# Patient Record
Sex: MALE | Race: OTHER | NOT HISPANIC OR LATINO | Employment: UNEMPLOYED | URBAN - METROPOLITAN AREA
[De-identification: names, ages, dates, MRNs, and addresses within clinical notes are randomized per-mention and may not be internally consistent; named-entity substitution may affect disease eponyms.]

---

## 2022-11-30 DIAGNOSIS — S82.401A CLOSED FRACTURE OF RIGHT TIBIA AND FIBULA, INITIAL ENCOUNTER: Primary | ICD-10-CM

## 2022-11-30 DIAGNOSIS — S82.201A CLOSED FRACTURE OF RIGHT TIBIA AND FIBULA, INITIAL ENCOUNTER: Primary | ICD-10-CM

## 2022-12-07 ENCOUNTER — OFFICE VISIT (OUTPATIENT)
Dept: ORTHOPEDICS | Facility: CLINIC | Age: 37
End: 2022-12-07
Payer: MEDICAID

## 2022-12-07 ENCOUNTER — HOSPITAL ENCOUNTER (OUTPATIENT)
Dept: RADIOLOGY | Facility: HOSPITAL | Age: 37
Discharge: HOME | End: 2022-12-07
Attending: ORTHOPAEDIC SURGERY
Payer: MEDICAID

## 2022-12-07 ENCOUNTER — ANESTHESIA EVENT (OUTPATIENT)
Dept: SURGERY | Facility: HOSPITAL | Age: 37
End: 2022-12-07
Payer: MEDICAID

## 2022-12-07 DIAGNOSIS — S82.141A CLOSED FRACTURE OF RIGHT TIBIAL PLATEAU, INITIAL ENCOUNTER: ICD-10-CM

## 2022-12-07 DIAGNOSIS — M25.561 CHRONIC PAIN OF RIGHT KNEE: Primary | ICD-10-CM

## 2022-12-07 DIAGNOSIS — S82.201A CLOSED FRACTURE OF RIGHT TIBIA AND FIBULA, INITIAL ENCOUNTER: ICD-10-CM

## 2022-12-07 DIAGNOSIS — S82.401A CLOSED FRACTURE OF RIGHT TIBIA AND FIBULA, INITIAL ENCOUNTER: ICD-10-CM

## 2022-12-07 DIAGNOSIS — S82.141A TIBIAL PLATEAU FRACTURE, RIGHT: ICD-10-CM

## 2022-12-07 DIAGNOSIS — G89.29 CHRONIC PAIN OF RIGHT KNEE: ICD-10-CM

## 2022-12-07 DIAGNOSIS — G89.29 CHRONIC PAIN OF RIGHT KNEE: Primary | ICD-10-CM

## 2022-12-07 DIAGNOSIS — M25.561 CHRONIC PAIN OF RIGHT KNEE: ICD-10-CM

## 2022-12-07 PROCEDURE — 1159F MED LIST DOCD IN RCRD: CPT | Mod: CPTII,,, | Performed by: ORTHOPAEDIC SURGERY

## 2022-12-07 PROCEDURE — 73564 X-RAY EXAM KNEE 4 OR MORE: CPT | Mod: TC,RT

## 2022-12-07 PROCEDURE — 99204 OFFICE O/P NEW MOD 45 MIN: CPT | Mod: 57,S$PBB,, | Performed by: ORTHOPAEDIC SURGERY

## 2022-12-07 PROCEDURE — 1159F PR MEDICATION LIST DOCUMENTED IN MEDICAL RECORD: ICD-10-PCS | Mod: CPTII,,, | Performed by: ORTHOPAEDIC SURGERY

## 2022-12-07 PROCEDURE — 99213 OFFICE O/P EST LOW 20 MIN: CPT | Mod: PBBFAC

## 2022-12-07 PROCEDURE — 99204 PR OFFICE/OUTPT VISIT, NEW, LEVL IV, 45-59 MIN: ICD-10-PCS | Mod: 57,S$PBB,, | Performed by: ORTHOPAEDIC SURGERY

## 2022-12-07 RX ORDER — HYDROCODONE BITARTRATE AND ACETAMINOPHEN 10; 325 MG/1; MG/1
1 TABLET ORAL EVERY 6 HOURS PRN
COMMUNITY
Start: 2022-11-30 | End: 2022-12-08

## 2022-12-07 RX ORDER — SODIUM CHLORIDE 9 MG/ML
INJECTION, SOLUTION INTRAVENOUS CONTINUOUS
Status: CANCELLED | OUTPATIENT
Start: 2022-12-07

## 2022-12-07 RX ORDER — MUPIROCIN 20 MG/G
OINTMENT TOPICAL
Status: CANCELLED | OUTPATIENT
Start: 2022-12-07

## 2022-12-07 NOTE — ANESTHESIA PREPROCEDURE EVALUATION
"                                                                                                             12/07/2022  Luca Jensen is a 37 y.o., male.    COVID STATUS: VACCINE 1 of 2 (PFIZER 11/24/21)  BETA-BLOCKER: NONE    PAT IN-OFFICE NURSE INTERVIEW 12/7/22    PROBLEM LIST:  -  RIGHT TIBIAL PLATEAU FRACTURE 2/2 STEPPING OFF CURB ONTO WET METAL GRATING 11/26/22  -  OBESITY CLASS II  -  SMOKER 5 PPY  -  ETOH - 4 SHOTS 1-2x/MONTH  -  NELLY SUSPECT - MOTHER REPORTS "SNORING" & WITNESSED APNEIC EPISODES; STOP-BANG=6    AM Rx DOS: NORCO PRN    ORDERS -   SURGEON: NONE PREVIOUSLY; NO NEW ORDERS  ANESTHESIA: NONE    Pre-op Assessment    I have reviewed the Patient Summary Reports.     I have reviewed the Nursing Notes. I have reviewed the NPO Status.   I have reviewed the Medications.     Review of Systems  Anesthesia Hx:  No problems with previous Anesthesia  History of prior surgery of interest to airway management or planning: Denies Family Hx of Anesthesia complications.   Denies Personal Hx of Anesthesia complications.   Hematology/Oncology:  Hematology Normal   Oncology Normal     EENT/Dental:EENT/Dental Normal   Cardiovascular:  Cardiovascular Normal     Pulmonary:  Pulmonary Normal    Renal/:  Renal/ Normal     Hepatic/GI:  Hepatic/GI Normal    Musculoskeletal:  Musculoskeletal Normal    Neurological:  Neurology Normal    Endocrine:  Endocrine Normal    Dermatological:  Skin Normal    Psych:  Psychiatric Normal           Physical Exam  General: Well nourished, Cooperative, Alert and Oriented    Airway:  Mallampati: I / I  Mouth Opening: Normal  TM Distance: Normal  Tongue: Normal  Neck ROM: Normal ROM    Dental:  Intact        Anesthesia Plan  Type of Anesthesia, risks & benefits discussed:    Anesthesia Type: Gen ETT  Intra-op Monitoring Plan: Standard ASA Monitors  Post Op Pain Control Plan: multimodal analgesia and IV/PO Opioids PRN  Induction:  IV  Airway Plan: Direct  Informed Consent: Informed " consent signed with the Patient and all parties understand the risks and agree with anesthesia plan.  All questions answered. Patient consented to blood products? No  ASA Score: 2  Day of Surgery Review of History & Physical: H&P Update referred to the surgeon/provider.    Ready For Surgery From Anesthesia Perspective.     .

## 2022-12-07 NOTE — PROGRESS NOTES
ATTENDING ADDENDUM    Luca Jensen  was evaluated at the time of the encounter with Dr Garcia PGY5.  HPI, PE and treatment plan was reviewed. Treatment plan was reasonable and necessary. Imaging was reviewed at the time of visit.     I was present during critical or key resident-provided service and procedure portions of the visit.    The risks, benefits, outcomes, and alternatives of conservative vs operative management were  discussed with the patient in clinic today. Informed consent was obtained for ORIF right tibial plateau fracture.    Patient understands that orthopaedic follow-up will be with [facility] orthopaedic staff and [facility] residents.

## 2022-12-07 NOTE — PROGRESS NOTES
[facility] Orthopaedic Surgery Clinic Note    In brief, Luca Jensen is a 37 y.o. male presenting as a new patient with right knee pain     Patient states that 11/26/2022 was stepping off of a curb onto a wet metal grate on the ground.  His right leg slipped out from underneath him.  He felt a valgus impaction of his leg as he landed on top of it.  Had immediate pain and swelling.  Was unable to bear weight.  Presented to [facility].  Radiographs and CT scan were obtained.  Patient was told he had a fracture of his lower leg.  Patient told the emergency department physicians that he was [place] he had said they gave him a knee immobilizer and crutches and instructed him to follow up with the outside orthopedist in [place] hometown.    Since that time, pain is gradually improved in the right lower leg.  Swelling has slightly gone down.  Has been compliant with nonweightbearing.  Has been using crutches for mobilization.  No other sided pain or trauma.  No numbness or tingling in the right lower extremity.  No prior injuries or surgeries to right lower leg.    Of note, patient has no medical problems.  He works driving trucks.  He enjoys going to the gym in his free time.  Smokes less than half a pack of cigarettes per day.      PMH: No past medical history on file.    PSH: No past surgical history on file.    SH:   Social History     Socioeconomic History    Marital status: Single   Tobacco Use    Smoking status: Never    Smokeless tobacco: Never       FH: No family history on file.    Allergies: Review of patient's allergies indicates:  No Known Allergies    ROS:  Constitutional- no fever, fatigue, weakness  Eye- no vision loss, eye redness, drainage, or pain  ENMT- no sore throat, ear pain, sinus pain/congestion, nasal congestion/drainage  Respiratory- no cough, wheezing, or shortness of breath  CV- no chest pain, no palpitations, no edema  GI- no N/V/D; no abdominal pain    Physical Exam:  There were no  vitals filed for this visit.    General: NAD  Cardio: RRR by peripheral pulse  Pulm: Normal WOB on room air, symmetric chest rise  Abd: Soft, NT/ND    MSK:  Swelling about the anterolateral aspect of the proximal tibial plateau   Tenderness along the lateral aspect of the tibial plateau   Moderate effusion   Motor intact L2-S1   Active knee range of motion 5-90   Unable to perform knee ligamentous examination secondary to pain and swelling   Sensation intact to light touch distally  DP palpable    Imaging:   Radiographs of the right knee independently reviewed demonstrate Schatzker 2 split depressed right proximal tibia fracture; there is substantial depression within the right tibial plateau articular surface; no diastasis of the proximal tibia/fibula joint    Assessment:  37-year-old male slip and fall onto his right lower extremity 11/26/2022 sustaining a right Schatzker 2 tibial plateau fracture    Discussed with the patient that his injury is operative.  Would like to perform surgery tomorrow.  Patient is amenable.  Plan for open reduction internal fixation right proximal tibial plateau with possible allograft.  Discussed that patient would likely remain nonweightbearing for 10-12 weeks after surgery.  Will likely need physical therapy.  Increased risk of posttraumatic arthritis.  Patient understands this.  Plan for surgery 12/8/22.  Consent obtained in clinic.  Patient will follow up tomorrow date of surgery.  Strict nonweightbearing.  Ice and elevation.    Mitesh Garcia MD  \[facility]\"" Orthopaedic Surgery  12/7/2022 2:58 PM

## 2022-12-08 ENCOUNTER — ANESTHESIA (OUTPATIENT)
Dept: SURGERY | Facility: HOSPITAL | Age: 37
End: 2022-12-08
Payer: MEDICAID

## 2022-12-08 ENCOUNTER — HOSPITAL ENCOUNTER (OUTPATIENT)
Facility: HOSPITAL | Age: 37
Discharge: HOME | End: 2022-12-08
Attending: ORTHOPAEDIC SURGERY | Admitting: ORTHOPAEDIC SURGERY
Payer: MEDICAID

## 2022-12-08 DIAGNOSIS — S82.141A TIBIAL PLATEAU FRACTURE, RIGHT: ICD-10-CM

## 2022-12-08 DIAGNOSIS — S82.141A CLOSED FRACTURE OF RIGHT TIBIAL PLATEAU, INITIAL ENCOUNTER: ICD-10-CM

## 2022-12-08 PROCEDURE — 25000003 PHARM REV CODE 250: Performed by: NURSE ANESTHETIST, CERTIFIED REGISTERED

## 2022-12-08 PROCEDURE — C1713 ANCHOR/SCREW BN/BN,TIS/BN: HCPCS | Performed by: ORTHOPAEDIC SURGERY

## 2022-12-08 PROCEDURE — 71000015 HC POSTOP RECOV 1ST HR: Performed by: ORTHOPAEDIC SURGERY

## 2022-12-08 PROCEDURE — 63600175 PHARM REV CODE 636 W HCPCS: Performed by: SPECIALIST

## 2022-12-08 PROCEDURE — 63600175 PHARM REV CODE 636 W HCPCS: Performed by: STUDENT IN AN ORGANIZED HEALTH CARE EDUCATION/TRAINING PROGRAM

## 2022-12-08 PROCEDURE — 27535 PR OPEN TX TIBIAL FRACTURE PROXIMAL UNICONDYLAR: ICD-10-PCS | Mod: RT,,, | Performed by: ORTHOPAEDIC SURGERY

## 2022-12-08 PROCEDURE — 71000016 HC POSTOP RECOV ADDL HR: Performed by: ORTHOPAEDIC SURGERY

## 2022-12-08 PROCEDURE — 01392 ANES OPN PX UPR TIB FIB&/PAT: CPT | Performed by: ORTHOPAEDIC SURGERY

## 2022-12-08 PROCEDURE — 29882 ARTHRS KNE SRG MNISC RPR M/L: CPT | Mod: 51,RT,, | Performed by: ORTHOPAEDIC SURGERY

## 2022-12-08 PROCEDURE — 37000009 HC ANESTHESIA EA ADD 15 MINS: Performed by: ORTHOPAEDIC SURGERY

## 2022-12-08 PROCEDURE — 25000003 PHARM REV CODE 250: Performed by: SPECIALIST

## 2022-12-08 PROCEDURE — 36000710: Performed by: ORTHOPAEDIC SURGERY

## 2022-12-08 PROCEDURE — 63600175 PHARM REV CODE 636 W HCPCS

## 2022-12-08 PROCEDURE — 63600175 PHARM REV CODE 636 W HCPCS: Performed by: NURSE ANESTHETIST, CERTIFIED REGISTERED

## 2022-12-08 PROCEDURE — 27535 TREAT KNEE FRACTURE: CPT | Mod: RT,,, | Performed by: ORTHOPAEDIC SURGERY

## 2022-12-08 PROCEDURE — 25000003 PHARM REV CODE 250: Performed by: STUDENT IN AN ORGANIZED HEALTH CARE EDUCATION/TRAINING PROGRAM

## 2022-12-08 PROCEDURE — 71000033 HC RECOVERY, INTIAL HOUR: Performed by: ORTHOPAEDIC SURGERY

## 2022-12-08 PROCEDURE — 63600175 PHARM REV CODE 636 W HCPCS: Performed by: ORTHOPAEDIC SURGERY

## 2022-12-08 PROCEDURE — 27201423 OPTIME MED/SURG SUP & DEVICES STERILE SUPPLY: Performed by: ORTHOPAEDIC SURGERY

## 2022-12-08 PROCEDURE — 29882 PR KNEE SCOPE,MED OR LAT MENIS REPAIR: ICD-10-PCS | Mod: 51,RT,, | Performed by: ORTHOPAEDIC SURGERY

## 2022-12-08 PROCEDURE — 25000003 PHARM REV CODE 250

## 2022-12-08 PROCEDURE — 37000008 HC ANESTHESIA 1ST 15 MINUTES: Performed by: ORTHOPAEDIC SURGERY

## 2022-12-08 PROCEDURE — 36000711: Performed by: ORTHOPAEDIC SURGERY

## 2022-12-08 DEVICE — IMPLANTABLE DEVICE: Type: IMPLANTABLE DEVICE | Site: LEG | Status: FUNCTIONAL

## 2022-12-08 DEVICE — SCREW BONE AXSOS 4X26MM TI: Type: IMPLANTABLE DEVICE | Site: LEG | Status: FUNCTIONAL

## 2022-12-08 DEVICE — SCREW AXSOS CANC 4X46MM: Type: IMPLANTABLE DEVICE | Site: LEG | Status: FUNCTIONAL

## 2022-12-08 DEVICE — SCREW BONE AXSOS 4X65MM TI: Type: IMPLANTABLE DEVICE | Site: LEG | Status: FUNCTIONAL

## 2022-12-08 DEVICE — SCREW BONE CORTICAL 3.5X28MM T: Type: IMPLANTABLE DEVICE | Site: LEG | Status: FUNCTIONAL

## 2022-12-08 RX ORDER — HYDROMORPHONE HYDROCHLORIDE 1 MG/ML
INJECTION, SOLUTION INTRAMUSCULAR; INTRAVENOUS; SUBCUTANEOUS
Status: DISCONTINUED | OUTPATIENT
Start: 2022-12-08 | End: 2022-12-08

## 2022-12-08 RX ORDER — PROCHLORPERAZINE EDISYLATE 5 MG/ML
5 INJECTION INTRAMUSCULAR; INTRAVENOUS ONCE AS NEEDED
Status: DISCONTINUED | OUTPATIENT
Start: 2022-12-08 | End: 2022-12-08 | Stop reason: HOSPADM

## 2022-12-08 RX ORDER — HYDROMORPHONE HYDROCHLORIDE 1 MG/ML
INJECTION, SOLUTION INTRAMUSCULAR; INTRAVENOUS; SUBCUTANEOUS
Status: DISCONTINUED
Start: 2022-12-08 | End: 2022-12-08 | Stop reason: WASHOUT

## 2022-12-08 RX ORDER — VANCOMYCIN HYDROCHLORIDE 1 G/20ML
INJECTION, POWDER, LYOPHILIZED, FOR SOLUTION INTRAVENOUS
Status: DISCONTINUED | OUTPATIENT
Start: 2022-12-08 | End: 2022-12-08 | Stop reason: HOSPADM

## 2022-12-08 RX ORDER — GABAPENTIN 300 MG/1
CAPSULE ORAL
Status: DISCONTINUED
Start: 2022-12-08 | End: 2022-12-08 | Stop reason: HOSPADM

## 2022-12-08 RX ORDER — SODIUM CHLORIDE 9 MG/ML
INJECTION, SOLUTION INTRAVENOUS CONTINUOUS
Status: DISCONTINUED | OUTPATIENT
Start: 2022-12-08 | End: 2022-12-08 | Stop reason: HOSPADM

## 2022-12-08 RX ORDER — CYCLOBENZAPRINE HCL 5 MG
5 TABLET ORAL 3 TIMES DAILY PRN
Qty: 30 TABLET | Refills: 0 | Status: SHIPPED | OUTPATIENT
Start: 2022-12-08 | End: 2022-12-18

## 2022-12-08 RX ORDER — IPRATROPIUM BROMIDE AND ALBUTEROL SULFATE 2.5; .5 MG/3ML; MG/3ML
3 SOLUTION RESPIRATORY (INHALATION) ONCE AS NEEDED
Status: DISCONTINUED | OUTPATIENT
Start: 2022-12-08 | End: 2022-12-08 | Stop reason: HOSPADM

## 2022-12-08 RX ORDER — MELOXICAM 7.5 MG/1
7.5 TABLET ORAL DAILY
Qty: 7 TABLET | Refills: 0 | Status: SHIPPED | OUTPATIENT
Start: 2022-12-08 | End: 2023-01-23

## 2022-12-08 RX ORDER — ONDANSETRON 2 MG/ML
INJECTION INTRAMUSCULAR; INTRAVENOUS
Status: DISCONTINUED | OUTPATIENT
Start: 2022-12-08 | End: 2022-12-08

## 2022-12-08 RX ORDER — TRAMADOL HYDROCHLORIDE 50 MG/1
TABLET ORAL
Status: DISCONTINUED
Start: 2022-12-08 | End: 2022-12-08 | Stop reason: HOSPADM

## 2022-12-08 RX ORDER — DIPHENHYDRAMINE HYDROCHLORIDE 50 MG/ML
25 INJECTION INTRAMUSCULAR; INTRAVENOUS ONCE AS NEEDED
Status: DISCONTINUED | OUTPATIENT
Start: 2022-12-08 | End: 2022-12-08 | Stop reason: HOSPADM

## 2022-12-08 RX ORDER — MUPIROCIN 20 MG/G
OINTMENT TOPICAL
Status: DISCONTINUED | OUTPATIENT
Start: 2022-12-08 | End: 2022-12-08 | Stop reason: HOSPADM

## 2022-12-08 RX ORDER — KETOROLAC TROMETHAMINE 30 MG/ML
INJECTION, SOLUTION INTRAMUSCULAR; INTRAVENOUS
Status: DISCONTINUED | OUTPATIENT
Start: 2022-12-08 | End: 2022-12-08

## 2022-12-08 RX ORDER — SUCCINYLCHOLINE CHLORIDE 20 MG/ML
INJECTION INTRAMUSCULAR; INTRAVENOUS
Status: DISCONTINUED | OUTPATIENT
Start: 2022-12-08 | End: 2022-12-08

## 2022-12-08 RX ORDER — SODIUM CHLORIDE, SODIUM LACTATE, POTASSIUM CHLORIDE, CALCIUM CHLORIDE 600; 310; 30; 20 MG/100ML; MG/100ML; MG/100ML; MG/100ML
INJECTION, SOLUTION INTRAVENOUS CONTINUOUS
Status: DISCONTINUED | OUTPATIENT
Start: 2022-12-08 | End: 2022-12-08 | Stop reason: HOSPADM

## 2022-12-08 RX ORDER — HYDROMORPHONE HYDROCHLORIDE 1 MG/ML
0.5 INJECTION, SOLUTION INTRAMUSCULAR; INTRAVENOUS; SUBCUTANEOUS EVERY 5 MIN PRN
Status: DISCONTINUED | OUTPATIENT
Start: 2022-12-08 | End: 2022-12-08 | Stop reason: HOSPADM

## 2022-12-08 RX ORDER — PROMETHAZINE HYDROCHLORIDE 25 MG/ML
INJECTION, SOLUTION INTRAMUSCULAR; INTRAVENOUS
Status: DISCONTINUED
Start: 2022-12-08 | End: 2022-12-08 | Stop reason: WASHOUT

## 2022-12-08 RX ORDER — MEPERIDINE HYDROCHLORIDE 25 MG/ML
INJECTION INTRAMUSCULAR; INTRAVENOUS; SUBCUTANEOUS
Status: DISCONTINUED
Start: 2022-12-08 | End: 2022-12-08 | Stop reason: WASHOUT

## 2022-12-08 RX ORDER — OXYCODONE AND ACETAMINOPHEN 5; 325 MG/1; MG/1
2 TABLET ORAL ONCE
Status: COMPLETED | OUTPATIENT
Start: 2022-12-08 | End: 2022-12-08

## 2022-12-08 RX ORDER — MIDAZOLAM HYDROCHLORIDE 1 MG/ML
2 INJECTION INTRAMUSCULAR; INTRAVENOUS ONCE AS NEEDED
Status: COMPLETED | OUTPATIENT
Start: 2022-12-08 | End: 2022-12-08

## 2022-12-08 RX ORDER — ACETAMINOPHEN 500 MG
TABLET ORAL
Status: DISCONTINUED
Start: 2022-12-08 | End: 2022-12-08 | Stop reason: HOSPADM

## 2022-12-08 RX ORDER — LIDOCAINE HYDROCHLORIDE 20 MG/ML
INJECTION, SOLUTION EPIDURAL; INFILTRATION; INTRACAUDAL; PERINEURAL
Status: DISCONTINUED | OUTPATIENT
Start: 2022-12-08 | End: 2022-12-08

## 2022-12-08 RX ORDER — TRAMADOL HYDROCHLORIDE 50 MG/1
50 TABLET ORAL
Status: COMPLETED | OUTPATIENT
Start: 2022-12-08 | End: 2022-12-08

## 2022-12-08 RX ORDER — ASPIRIN 81 MG/1
81 TABLET ORAL 2 TIMES DAILY
Qty: 56 TABLET | Refills: 0 | Status: SHIPPED | OUTPATIENT
Start: 2022-12-08 | End: 2023-01-23

## 2022-12-08 RX ORDER — ROCURONIUM BROMIDE 10 MG/ML
INJECTION, SOLUTION INTRAVENOUS
Status: DISCONTINUED | OUTPATIENT
Start: 2022-12-08 | End: 2022-12-08

## 2022-12-08 RX ORDER — DEXAMETHASONE SODIUM PHOSPHATE 4 MG/ML
INJECTION, SOLUTION INTRA-ARTICULAR; INTRALESIONAL; INTRAMUSCULAR; INTRAVENOUS; SOFT TISSUE
Status: DISCONTINUED | OUTPATIENT
Start: 2022-12-08 | End: 2022-12-08

## 2022-12-08 RX ORDER — GABAPENTIN 300 MG/1
600 CAPSULE ORAL
Status: COMPLETED | OUTPATIENT
Start: 2022-12-08 | End: 2022-12-08

## 2022-12-08 RX ORDER — MIDAZOLAM HYDROCHLORIDE 1 MG/ML
INJECTION INTRAMUSCULAR; INTRAVENOUS
Status: DISCONTINUED
Start: 2022-12-08 | End: 2022-12-08 | Stop reason: HOSPADM

## 2022-12-08 RX ORDER — EPHEDRINE SULFATE 50 MG/ML
INJECTION, SOLUTION INTRAVENOUS
Status: DISCONTINUED | OUTPATIENT
Start: 2022-12-08 | End: 2022-12-08

## 2022-12-08 RX ORDER — MEPERIDINE HYDROCHLORIDE 25 MG/ML
12.5 INJECTION INTRAMUSCULAR; INTRAVENOUS; SUBCUTANEOUS ONCE
Status: DISCONTINUED | OUTPATIENT
Start: 2022-12-08 | End: 2022-12-08 | Stop reason: HOSPADM

## 2022-12-08 RX ORDER — ONDANSETRON 2 MG/ML
4 INJECTION INTRAMUSCULAR; INTRAVENOUS ONCE
Status: DISCONTINUED | OUTPATIENT
Start: 2022-12-08 | End: 2022-12-08 | Stop reason: HOSPADM

## 2022-12-08 RX ORDER — ACETAMINOPHEN 500 MG
1000 TABLET ORAL
Status: COMPLETED | OUTPATIENT
Start: 2022-12-08 | End: 2022-12-08

## 2022-12-08 RX ORDER — LIDOCAINE HYDROCHLORIDE 10 MG/ML
1 INJECTION, SOLUTION EPIDURAL; INFILTRATION; INTRACAUDAL; PERINEURAL ONCE
Status: DISCONTINUED | OUTPATIENT
Start: 2022-12-08 | End: 2022-12-08 | Stop reason: HOSPADM

## 2022-12-08 RX ORDER — ACETAMINOPHEN 500 MG
500 TABLET ORAL EVERY 8 HOURS PRN
Qty: 21 TABLET | Refills: 0 | Status: SHIPPED | OUTPATIENT
Start: 2022-12-08 | End: 2023-01-23

## 2022-12-08 RX ORDER — KETAMINE HYDROCHLORIDE 100 MG/ML
INJECTION, SOLUTION INTRAMUSCULAR; INTRAVENOUS
Status: DISCONTINUED | OUTPATIENT
Start: 2022-12-08 | End: 2022-12-08

## 2022-12-08 RX ORDER — PHENYLEPHRINE HYDROCHLORIDE 10 MG/ML
INJECTION INTRAVENOUS
Status: DISCONTINUED | OUTPATIENT
Start: 2022-12-08 | End: 2022-12-08

## 2022-12-08 RX ORDER — ONDANSETRON 4 MG/1
4 TABLET, FILM COATED ORAL 2 TIMES DAILY PRN
Qty: 14 TABLET | Refills: 0 | Status: SHIPPED | OUTPATIENT
Start: 2022-12-08 | End: 2022-12-15

## 2022-12-08 RX ORDER — HYDROMORPHONE HYDROCHLORIDE 1 MG/ML
INJECTION, SOLUTION INTRAMUSCULAR; INTRAVENOUS; SUBCUTANEOUS
Status: COMPLETED
Start: 2022-12-08 | End: 2022-12-08

## 2022-12-08 RX ORDER — PROPOFOL 10 MG/ML
VIAL (ML) INTRAVENOUS
Status: DISCONTINUED | OUTPATIENT
Start: 2022-12-08 | End: 2022-12-08

## 2022-12-08 RX ORDER — FENTANYL CITRATE 50 UG/ML
INJECTION, SOLUTION INTRAMUSCULAR; INTRAVENOUS
Status: DISCONTINUED | OUTPATIENT
Start: 2022-12-08 | End: 2022-12-08

## 2022-12-08 RX ORDER — HYDROMORPHONE HYDROCHLORIDE 1 MG/ML
0.2 INJECTION, SOLUTION INTRAMUSCULAR; INTRAVENOUS; SUBCUTANEOUS EVERY 5 MIN PRN
Status: DISCONTINUED | OUTPATIENT
Start: 2022-12-08 | End: 2022-12-08 | Stop reason: HOSPADM

## 2022-12-08 RX ORDER — OXYCODONE HYDROCHLORIDE 5 MG/1
10 TABLET ORAL EVERY 6 HOURS PRN
Qty: 56 TABLET | Refills: 0 | Status: SHIPPED | OUTPATIENT
Start: 2022-12-08 | End: 2022-12-15

## 2022-12-08 RX ADMIN — ROCURONIUM BROMIDE 20 MG: 10 SOLUTION INTRAVENOUS at 12:12

## 2022-12-08 RX ADMIN — SODIUM CHLORIDE 2 G: 900 INJECTION INTRAVENOUS at 12:12

## 2022-12-08 RX ADMIN — PHENYLEPHRINE HYDROCHLORIDE 300 MCG: 10 INJECTION INTRAVENOUS at 12:12

## 2022-12-08 RX ADMIN — KETOROLAC TROMETHAMINE 30 MG: 30 INJECTION, SOLUTION INTRAMUSCULAR; INTRAVENOUS at 12:12

## 2022-12-08 RX ADMIN — TRAMADOL HYDROCHLORIDE 50 MG: 50 TABLET, COATED ORAL at 11:12

## 2022-12-08 RX ADMIN — HYDROMORPHONE HYDROCHLORIDE 0.5 MG: 1 INJECTION, SOLUTION INTRAMUSCULAR; INTRAVENOUS; SUBCUTANEOUS at 01:12

## 2022-12-08 RX ADMIN — SODIUM CHLORIDE, POTASSIUM CHLORIDE, SODIUM LACTATE AND CALCIUM CHLORIDE: 600; 310; 30; 20 INJECTION, SOLUTION INTRAVENOUS at 01:12

## 2022-12-08 RX ADMIN — PHENYLEPHRINE HYDROCHLORIDE 100 MCG: 10 INJECTION INTRAVENOUS at 01:12

## 2022-12-08 RX ADMIN — ONDANSETRON 4 MG: 2 INJECTION INTRAMUSCULAR; INTRAVENOUS at 01:12

## 2022-12-08 RX ADMIN — SODIUM CHLORIDE: 9 INJECTION, SOLUTION INTRAVENOUS at 11:12

## 2022-12-08 RX ADMIN — EPHEDRINE SULFATE 20 MG: 50 INJECTION INTRAVENOUS at 12:12

## 2022-12-08 RX ADMIN — DEXAMETHASONE SODIUM PHOSPHATE 8 MG: 4 INJECTION, SOLUTION INTRA-ARTICULAR; INTRALESIONAL; INTRAMUSCULAR; INTRAVENOUS; SOFT TISSUE at 12:12

## 2022-12-08 RX ADMIN — KETAMINE HYDROCHLORIDE 25 MG: 100 INJECTION, SOLUTION INTRAMUSCULAR; INTRAVENOUS at 12:12

## 2022-12-08 RX ADMIN — SUCCINYLCHOLINE CHLORIDE 180 MG: 20 INJECTION, SOLUTION INTRAMUSCULAR; INTRAVENOUS at 12:12

## 2022-12-08 RX ADMIN — OXYCODONE HYDROCHLORIDE AND ACETAMINOPHEN 2 TABLET: 5; 325 TABLET ORAL at 03:12

## 2022-12-08 RX ADMIN — HYDROMORPHONE HYDROCHLORIDE 0.5 MG: 1 INJECTION, SOLUTION INTRAMUSCULAR; INTRAVENOUS; SUBCUTANEOUS at 02:12

## 2022-12-08 RX ADMIN — SODIUM CHLORIDE, POTASSIUM CHLORIDE, SODIUM LACTATE AND CALCIUM CHLORIDE: 600; 310; 30; 20 INJECTION, SOLUTION INTRAVENOUS at 12:12

## 2022-12-08 RX ADMIN — SUGAMMADEX 200 MG: 100 INJECTION, SOLUTION INTRAVENOUS at 02:12

## 2022-12-08 RX ADMIN — PHENYLEPHRINE HYDROCHLORIDE 200 MCG: 10 INJECTION INTRAVENOUS at 12:12

## 2022-12-08 RX ADMIN — MIDAZOLAM HYDROCHLORIDE 2 MG: 1 INJECTION, SOLUTION INTRAMUSCULAR; INTRAVENOUS at 11:12

## 2022-12-08 RX ADMIN — PROPOFOL 200 MG: 10 INJECTION, EMULSION INTRAVENOUS at 12:12

## 2022-12-08 RX ADMIN — ACETAMINOPHEN 1000 MG: 500 TABLET ORAL at 11:12

## 2022-12-08 RX ADMIN — GABAPENTIN 600 MG: 300 CAPSULE ORAL at 11:12

## 2022-12-08 RX ADMIN — FENTANYL CITRATE 100 MCG: 50 INJECTION, SOLUTION INTRAMUSCULAR; INTRAVENOUS at 12:12

## 2022-12-08 RX ADMIN — PHENYLEPHRINE HYDROCHLORIDE 100 MCG: 10 INJECTION INTRAVENOUS at 12:12

## 2022-12-08 RX ADMIN — KETAMINE HYDROCHLORIDE 25 MG: 100 INJECTION, SOLUTION INTRAMUSCULAR; INTRAVENOUS at 01:12

## 2022-12-08 RX ADMIN — LIDOCAINE HYDROCHLORIDE 100 MG: 20 INJECTION, SOLUTION EPIDURAL; INFILTRATION; INTRACAUDAL; PERINEURAL at 12:12

## 2022-12-08 NOTE — TRANSFER OF CARE
"Anesthesia Transfer of Care Note    Patient: Luca Jensen    Procedure(s) Performed: Procedure(s) (LRB):  ORIF, FRACTURE, TIBIA, PLATEAU (Right)    Patient location: PACU    Anesthesia Type: general    Transport from OR: Transported from OR on room air with adequate spontaneous ventilation    Post pain: adequate analgesia    Post assessment: no apparent anesthetic complications    Post vital signs: stable    Level of consciousness: sedated    Nausea/Vomiting: no nausea/vomiting    Complications: none    Transfer of care protocol was followed      Last vitals:   Visit Vitals  BP (!) 151/110   Pulse 88   Temp 36 °C (96.8 °F) (Temporal)   Resp 20   Ht 5' 6" (1.676 m)   Wt 102.1 kg (225 lb)   SpO2 100%   BMI 36.32 kg/m²     "

## 2022-12-08 NOTE — H&P
[facility] Orthopaedic Surgery Clinic Note     In brief, Luca Jensen is a 37 y.o. male presenting as a new patient with right knee pain     Patient states that 11/26/2022 was stepping off of a curb onto a wet metal grate on the ground.  His right leg slipped out from underneath him.  He felt a valgus impaction of his leg as he landed on top of it.  Had immediate pain and swelling.  Was unable to bear weight.  Presented to [facility].  Radiographs and CT scan were obtained.  Patient was told he had a fracture of his lower leg.  Patient told the emergency department physicians that [place] was [place] he had said they gave him a knee immobilizer and crutches and instructed him to follow up with the outside orthopedist in [place] hometown.     Since that time, pain is gradually improved in the right lower leg.  Swelling has slightly gone down.  Has been compliant with nonweightbearing.  Has been using crutches for mobilization.  No other sided pain or trauma.  No numbness or tingling in the right lower extremity.  No prior injuries or surgeries to right lower leg.    Of note, patient has no medical problems.  He works driving trucks.  He enjoys going to the gym in his free time.  Smokes less than half a pack of cigarettes per day.        PMH: No past medical history on file.     PSH: No past surgical history on file.     SH:   Social History              Socioeconomic History    Marital status: Single   Tobacco Use    Smoking status: Never    Smokeless tobacco: Never            FH: No family history on file.     Allergies: Review of patient's allergies indicates:  No Known Allergies     ROS:  Constitutional- no fever, fatigue, weakness  Eye- no vision loss, eye redness, drainage, or pain  ENMT- no sore throat, ear pain, sinus pain/congestion, nasal congestion/drainage  Respiratory- no cough, wheezing, or shortness of breath  CV- no chest pain, no palpitations, no edema  GI- no N/V/D; no abdominal pain     Physical  Exam:  There were no vitals filed for this visit.     General: NAD  Cardio: RRR by peripheral pulse  Pulm: Normal WOB on room air, symmetric chest rise  Abd: Soft, NT/ND     MSK:  Swelling about the anterolateral aspect of the proximal tibial plateau   Tenderness along the lateral aspect of the tibial plateau   Moderate effusion   Motor intact L2-S1   Active knee range of motion 5-90   Unable to perform knee ligamentous examination secondary to pain and swelling   Sensation intact to light touch distally  DP palpable     Imaging:   Radiographs of the right knee independently reviewed demonstrate Schatzker 2 split depressed right proximal tibia fracture; there is substantial depression within the right tibial plateau articular surface; no diastasis of the proximal tibia/fibula joint     Assessment:  37-year-old male slip and fall onto his right lower extremity 11/26/2022 sustaining a right Schatzker 2 tibial plateau fracture     Discussed with the patient that his injury is operative.  Would like to perform surgery tomorrow.  Patient is amenable.  Plan for open reduction internal fixation right proximal tibial plateau with possible allograft.  Discussed that patient would likely remain nonweightbearing for 10-12 weeks after surgery.  Will likely need physical therapy.  Increased risk of posttraumatic arthritis.  Patient understands this.  Plan for surgery 12/8/22.  Consent obtained in clinic.  Patient will follow up tomorrow date of surgery.  Strict nonweightbearing.  Ice and elevation.     Mitesh Garcia MD  [facility] Orthopaedic Surgery  12/7/2022 2:58 PM

## 2022-12-08 NOTE — ANESTHESIA PROCEDURE NOTES
Intubation    Date/Time: 12/8/2022 12:08 PM  Performed by: Shin Payne CRNA  Authorized by: Rossi Pina MD     Intubation:     Induction:  Intravenous    Intubated:  Postinduction    Mask Ventilation:  Easy mask    Attempts:  1    Attempted By:  CRNA    Method of Intubation:  Video laryngoscopy    Blade:  Lamb 3    Laryngeal View Grade: Grade I - full view of cords      Difficult Airway Encountered?: No      Complications:  None    Airway Device:  Oral endotracheal tube    Airway Device Size:  7.5    Style/Cuff Inflation:  Cuffed (inflated to minimal occlusive pressure)    Inflation Amount (mL):  8    Tube secured:  22    Secured at:  The lips    Placement Verified By:  Capnometry    Complicating Factors:  None    Findings Post-Intubation:  BS equal bilateral and atraumatic/condition of teeth unchanged

## 2022-12-08 NOTE — DISCHARGE SUMMARY
[facility] - Periop Services  Discharge Note  Short Stay    Procedure(s) (LRB):   ORIF, FRACTURE, TIBIA, PLATEAU (Right)      OUTCOME: Patient tolerated treatment/procedure well without complication and is now ready for discharge.    DISPOSITION: Home or Self Care    FINAL DIAGNOSIS:  <principal problem not specified>    FOLLOWUP: In clinic    DISCHARGE INSTRUCTIONS:    Discharge Procedure Orders   Leave dressing on - Keep it clean, dry, and intact until clinic visit     Weight bearing restrictions (specify):   Order Comments: Nwb rle         Clinical Reference Documents Added to Patient Instructions         Document    OPEN REDUCTION AND INTERNAL FIXATION SURGERY DISCHARGE INSTRUCTIONS (ENGLISH)            TIME SPENT ON DISCHARGE: 30 minutes

## 2022-12-08 NOTE — ANESTHESIA POSTPROCEDURE EVALUATION
Anesthesia Post Evaluation    Patient: Luca Jensen    Procedure(s) Performed: Procedure(s) (LRB):  ORIF, FRACTURE, TIBIA, PLATEAU (Right)    Final Anesthesia Type: general      Patient location during evaluation: PACU  Patient participation: Yes- Able to Participate  Level of consciousness: awake and responds to stimulation  Post-procedure vital signs: reviewed and stable  Pain management: adequate  Airway patency: patent    PONV status at discharge: No PONV  Anesthetic complications: no      Cardiovascular status: blood pressure returned to baseline  Respiratory status: unassisted  Hydration status: euvolemic  Follow-up not needed.          Vitals Value Taken Time   /80 12/08/22 1445   Temp 36.9 °C (98.4 °F) 12/08/22 1420   Pulse 105 12/08/22 1445   Resp 20 12/08/22 1505   SpO2 97 % 12/08/22 1445         Event Time   Out of Recovery 14:48:00         Pain/Bismark Score: Pain Rating Prior to Med Admin: 10 (12/8/2022  3:05 PM)  Bismark Score: 9 (12/8/2022  2:33 PM)

## 2022-12-08 NOTE — DISCHARGE INSTRUCTIONS
Ortho    · Keep follow up appointment on December 21st at 11:30 am at [facility].    · Take pain medications as prescribed.    · Keep leg elevated on pillow.     · No driving or consuming alcohol for the next 24 hours or while taking narcotic pain medicine.    · May apply ice pack to surgical area for 20 minutes at a time 6-8 times per day.    · Dressing: Leave clean and dry until follow up appointment.    · NO WEIGHT BEARING ON RIGHT LEG until cleared by MD.    · Notify MD of any moderate to severe pain unrelieved by pain medicine or for any signs of infection including fever above 100.4, excessive redness or swelling, yellow/green foul- smelling drainage, nausea or vomiting. Call clinic at: [phone]. After business hours, if you are unable to reach a doctor on call at [phone] or your concern is an emergency, call [phone] or report to your nearest emergency room.    · Thanks for choosing [facility]! Have a smooth recovery!

## 2022-12-08 NOTE — OP NOTE
[facility]                      [address] [place] [id number]    PATIENT NAME:      SHERRY JENSEN   YOB: 1985  CSN:               [id number]  MRN:               [id number]  ADMIT DATE:        12/08/2022 08:38:00  PHYSICIAN:         Yared Barr MD                          OPERATIVE REPORT      DATE OF SURGERY:    12/08/2022 00:00:00    SURGEON:  Yared Barr MD    PREOPERATIVE DIAGNOSIS:  Right Schatzker II tibial plateau fracture.    POSTOPERATIVE DIAGNOSIS:      Right Schatzker II tibial plateau  Lateral meniscus tear    PROCEDURE PERFORMED:      Open reduction, internal fixation of right Schatzker II   tibial plateau fracture.  2. Lateral meniscal repair    RESIDENT PHYSICIAN:  Dr Mitesh Gurrola, PGY-5 and Dr. Aquiles Becerra, PGY-3.    ANESTHESIA:  General.    IMPLANTS:      Implant Name Type Inv. Item Serial No.  Lot No. LRB No. Used Action   2 hole proximel lateral plate    JOJO  Right 1 Implanted   K-WIRE TRCR PNT [id number] SS - [id number]  K-WIRE TRCR PNT [id number]  JOJO [id number]  Right 4 Implanted and Explanted   SCREW BONE CORTICAL 3.5X28MM T - [id number]  SCREW BONE CORTICAL 3.5X28MM T  [employer]JO [employer].  Right 1 Implanted   4.0x60 locking     JOJO  Right 2 Implanted   SCREW BONE AXSOS 4X65MM TI - [id number]  SCREW BONE AXSOS 4X65MM TI  [employer].  Right 1 Implanted   SCREW BONE AXSOS 4X26MM TI - [id number]  SCREW BONE AXSOS 4X26MM TI  [employer].  Right 1 Implanted   SCREW AXSOS CANC 4X46MM - [id number]  SCREW AXSOS CANC 4X46MM  [employer].  Right 1 Implanted         TOURNIQUET:  250 mmHg for 85 minutes.    INTRAOPERATIVE COMPLICATIONS:  None.    ESTIMATED BLOOD LOSS:  50 cc.    PREOPERATIVE NOTE:  Mr. Jensen is a 37-year-old male who came to our clinic for   assessment of his right knee injury.  The patient reports that on November 26, 2022, he  slipped on a metal grate in [place].  He was seen by a local   orthopedic surgeon who recommended surgery.  Ultimately, he had difficulty   finding an orthopedic surgeon to accept his insurance.  He returned home to   [place] and came to our clinic for followup assessment and definitive   management in a delayed fashion.    While in clinic, the risks, benefits, outcomes and alternatives of conservative   management were discussed with the patient.  Informed consent was obtained for   the aforementioned procedure.    PROCEDURE IN DETAIL:  Patient was brought to the operating room, positioned   supine on the OR table.  A general anesthetic was administered by Anesthesia   colleagues.  Preoperative pause was performed to confirm the site and side of   surgery.  Preop antibiotics were given.  The right lower extremity was prepped   and draped in the usual fashion.  It was exsanguinated and tourniquet inflated   to 250 mmHg.    We began by making an incision over the lateral aspect of the patient's leg for   approximately 2 to 3 cm proximal to the knee joint, extending down the   anterolateral aspect of the patient's tibia.  It was taken down through the   subcutaneous tissue.  We incised the anterior compartment fascia leaving a small   cuff for repair.  Tibialis anterior was elevated subperiosteally.  We   identified the fracture fragment.  We open-booked the split in the lateral   plateau and identified 2 to 3 large osteochondral fragments that were depressed.    We performed a submeniscal arthrotomy and identified a large lateral meniscus cell from midbody extending anteriorly.  We passed 2 times 2-0 Vicryl sutures in a horizontal mattress configuration to the meniscus as well as capsule for repair as well as traction on the meniscus.        Bone fragments were elevated with the bone tamp.  Using K-wires, we provisionally held   these in place and closed the book on the open lateral plateau fracture.  The    fracture was then stabilized with a Pedro AxSOS lateral anatomic tibial   plateau plate.  Meniscal repair sutures were passed through the top holes of this plate  Using a periarticular clamp, the lateral plateau itself was compressed.  It   was then stabilized with the Pedro AxSOS locking screws.    Intraoperative fluoroscopy confirmed near anatomic reduction of our fracture and   appropriate position of our hardware.    The wound was thoroughly irrigated with saline, followed by peroxide, followed   by saline.  1 g powdered vancomycin was added to the patient's incision.  The   tibialis and anterior fascia were loosely apposed with an interrupted suture.    Subcutaneous tissue was closed with 2-0 Vicryl sutures.  Skin was closed with   staples.  A sterile dressing was applied.  General anesthetic was reversed and   the patient was transferred to the hospital stretcher.  They were brought to the   recovery in stable condition.  There were no intraoperative complications.    POSTOPERATIVE PLAN:    1. The patient will be nonweightbearing on the right lower extremity for a   period of a minimum of 6 weeks from the time of surgery.   I will see him back in 2 weeks' time for repeat clinical examination, imaging,   and staple removal.    ______________________________  MD SHY Cardenas/AQS  DD:  12/08/2022  Time:  02:14PM  DT:  12/08/2022  Time:  02:56PM  Job #:  [id number]      OPERATIVE REPORT

## 2022-12-12 VITALS
HEART RATE: 100 BPM | BODY MASS INDEX: 36.16 KG/M2 | OXYGEN SATURATION: 99 % | WEIGHT: 225 LBS | TEMPERATURE: 98 F | RESPIRATION RATE: 20 BRPM | SYSTOLIC BLOOD PRESSURE: 150 MMHG | HEIGHT: 66 IN | DIASTOLIC BLOOD PRESSURE: 89 MMHG

## 2022-12-21 ENCOUNTER — HOSPITAL ENCOUNTER (OUTPATIENT)
Dept: RADIOLOGY | Facility: HOSPITAL | Age: 37
Discharge: HOME | End: 2022-12-21
Attending: STUDENT IN AN ORGANIZED HEALTH CARE EDUCATION/TRAINING PROGRAM
Payer: MEDICAID

## 2022-12-21 ENCOUNTER — OFFICE VISIT (OUTPATIENT)
Dept: ORTHOPEDICS | Facility: CLINIC | Age: 37
End: 2022-12-21
Payer: MEDICAID

## 2022-12-21 DIAGNOSIS — S82.201A CLOSED FRACTURE OF RIGHT TIBIA AND FIBULA, INITIAL ENCOUNTER: Primary | ICD-10-CM

## 2022-12-21 DIAGNOSIS — S82.401A CLOSED FRACTURE OF RIGHT TIBIA AND FIBULA, INITIAL ENCOUNTER: ICD-10-CM

## 2022-12-21 DIAGNOSIS — S82.401A CLOSED FRACTURE OF RIGHT TIBIA AND FIBULA, INITIAL ENCOUNTER: Primary | ICD-10-CM

## 2022-12-21 DIAGNOSIS — S82.201A CLOSED FRACTURE OF RIGHT TIBIA AND FIBULA, INITIAL ENCOUNTER: ICD-10-CM

## 2022-12-21 PROCEDURE — 99024 POSTOP FOLLOW-UP VISIT: CPT | Mod: ,,, | Performed by: ORTHOPAEDIC SURGERY

## 2022-12-21 PROCEDURE — 1159F PR MEDICATION LIST DOCUMENTED IN MEDICAL RECORD: ICD-10-PCS | Mod: CPTII,,, | Performed by: ORTHOPAEDIC SURGERY

## 2022-12-21 PROCEDURE — 1159F MED LIST DOCD IN RCRD: CPT | Mod: CPTII,,, | Performed by: ORTHOPAEDIC SURGERY

## 2022-12-21 PROCEDURE — 99212 OFFICE O/P EST SF 10 MIN: CPT | Mod: PBBFAC

## 2022-12-21 PROCEDURE — 73562 X-RAY EXAM OF KNEE 3: CPT | Mod: TC,RT

## 2022-12-21 PROCEDURE — 99024 PR POST-OP FOLLOW-UP VISIT: ICD-10-PCS | Mod: ,,, | Performed by: ORTHOPAEDIC SURGERY

## 2022-12-21 RX ORDER — HYDROCODONE BITARTRATE AND ACETAMINOPHEN 5; 325 MG/1; MG/1
1 TABLET ORAL EVERY 6 HOURS PRN
Qty: 28 TABLET | Refills: 0 | Status: SHIPPED | OUTPATIENT
Start: 2022-12-21 | End: 2023-01-23

## 2022-12-21 NOTE — PROGRESS NOTES
No past medical history on file.    Past Surgical History:   Procedure Laterality Date    HERNIA REPAIR      OPEN REDUCTION AND INTERNAL FIXATION (ORIF) OF FRACTURE OF TIBIAL PLATEAU Right 12/8/2022    Procedure: ORIF, FRACTURE, TIBIA, PLATEAU;  Surgeon: Yared Barr MD;  Location: [facility];  Service: Orthopedics;  Laterality: Right;    TONSILLECTOMY         Current Outpatient Medications   Medication Sig    acetaminophen (TYLENOL) 500 MG tablet Take 1 tablet (500 mg total) by mouth every 8 (eight) hours as needed for Pain.    aspirin (ECOTRIN) 81 MG EC tablet Take 1 tablet (81 mg total) by mouth 2 (two) times a day.    HYDROcodone-acetaminophen (NORCO) 5-325 mg per tablet Take 1 tablet by mouth every 6 (six) hours as needed for Pain.    meloxicam (MOBIC) 7.5 MG tablet Take 1 tablet (7.5 mg total) by mouth once daily.     No current facility-administered medications for this visit.       Review of patient's allergies indicates:  No Known Allergies    No family history on file.    Social History     Socioeconomic History    Marital status: Single   Tobacco Use    Smoking status: Every Day     Packs/day: 0.25     Years: 18.00     Pack years: 4.50     Types: Cigarettes    Smokeless tobacco: Never   Substance and Sexual Activity    Alcohol use: Yes     Alcohol/week: 4.0 standard drinks     Types: 4 Shots of liquor per week     Comment: once every couple weeks    Drug use: Never       Chief Complaint:   Chief Complaint   Patient presents with    Right Knee - Pain       History of present illness:  30 37-year-old male presents today status post ORIF of right tibial plateau fracture.  Overall doing very well.  Has maintained nonweightbearing status.  On an aspirin for blood thinners.  Without complaints today.      Review of Systems:    Constitution: Negative for chills, fever, and sweats.  Negative for unexplained weight loss.    HENT:  Negative for headaches and blurry vision.    Cardiovascular:Negative for chest  pain or irregular heart beat. Negative for hypertension.    Respiratory:  Negative for cough and shortness of breath.    Gastrointestinal: Negative for abdominal pain, heartburn, melena, nausea, and vomitting.    Genitourinary:  Negative bladder incontinence and dysuria.    Musculoskeletal:  See HPI    Neurological: Negative for numbness.    Psychiatric/Behavioral: Negative for depression.  The patient is not nervous/anxious.      Endocrine: Negative for polyuria    Hematologic/Lymphatic: Negative for bleeding problem.  Does not bruise/bleed easily.    Skin: Negative for poor would healing and rash      Physical Examination:    Vital Signs:  There were no vitals filed for this visit.    There is no height or weight on file to calculate BMI.    General: No acute distress, alert and oriented, healthy appearing    HEENT: Head is atraumatic, mucous membranes are moist    Neck: Supples, no JVD    Cardiovascular: Palpable dorsalis pedis and posterior tibial pulses, regular rate and rhythm to those pulses    Lungs: Breathing non-labored    Skin: no rashes appreciated    Neurologic: Can flex and extend knees, ankles, and toes. Sensation is grossly intact    Right knee:  Range of motion 10-95.  Incision clean and dry.  Brisk cap refill distally.  Stable to varus and valgus stress    X-rays:  Three views of the right knee reviewed.  Patient with well-fixed lateral tibial plateau fracture.  No subsidence.  No loss of reduction     Assessment::  Status post ORIF right tibial plateau fracture    Plan:  Continue nonweightbearing right lower extremity.  Discontinue staples today.  Refill pain medication.  Follow up in a month with repeat x-rays right knee.    This note was created using [employer] voice recognition software that occasionally misinterpreted phrases or words.    Consult note is delivered via Epic messaging service.

## 2023-01-23 ENCOUNTER — OFFICE VISIT (OUTPATIENT)
Dept: ORTHOPEDICS | Facility: CLINIC | Age: 38
End: 2023-01-23
Payer: MEDICAID

## 2023-01-23 ENCOUNTER — HOSPITAL ENCOUNTER (OUTPATIENT)
Dept: RADIOLOGY | Facility: HOSPITAL | Age: 38
Discharge: HOME | End: 2023-01-23
Attending: STUDENT IN AN ORGANIZED HEALTH CARE EDUCATION/TRAINING PROGRAM
Payer: MEDICAID

## 2023-01-23 VITALS
WEIGHT: 221.63 LBS | HEIGHT: 66 IN | TEMPERATURE: 98 F | RESPIRATION RATE: 18 BRPM | BODY MASS INDEX: 35.62 KG/M2 | SYSTOLIC BLOOD PRESSURE: 128 MMHG | OXYGEN SATURATION: 98 % | DIASTOLIC BLOOD PRESSURE: 85 MMHG | HEART RATE: 90 BPM

## 2023-01-23 DIAGNOSIS — M25.561 ACUTE PAIN OF RIGHT KNEE: ICD-10-CM

## 2023-01-23 DIAGNOSIS — M25.561 ACUTE PAIN OF RIGHT KNEE: Primary | ICD-10-CM

## 2023-01-23 PROCEDURE — 3079F PR MOST RECENT DIASTOLIC BLOOD PRESSURE 80-89 MM HG: ICD-10-PCS | Mod: CPTII,,, | Performed by: ORTHOPAEDIC SURGERY

## 2023-01-23 PROCEDURE — 99024 POSTOP FOLLOW-UP VISIT: CPT | Mod: ,,, | Performed by: ORTHOPAEDIC SURGERY

## 2023-01-23 PROCEDURE — 3079F DIAST BP 80-89 MM HG: CPT | Mod: CPTII,,, | Performed by: ORTHOPAEDIC SURGERY

## 2023-01-23 PROCEDURE — 3074F PR MOST RECENT SYSTOLIC BLOOD PRESSURE < 130 MM HG: ICD-10-PCS | Mod: CPTII,,, | Performed by: ORTHOPAEDIC SURGERY

## 2023-01-23 PROCEDURE — 73564 X-RAY EXAM KNEE 4 OR MORE: CPT | Mod: TC,RT

## 2023-01-23 PROCEDURE — 3074F SYST BP LT 130 MM HG: CPT | Mod: CPTII,,, | Performed by: ORTHOPAEDIC SURGERY

## 2023-01-23 PROCEDURE — 3008F BODY MASS INDEX DOCD: CPT | Mod: CPTII,,, | Performed by: ORTHOPAEDIC SURGERY

## 2023-01-23 PROCEDURE — 99024 PR POST-OP FOLLOW-UP VISIT: ICD-10-PCS | Mod: ,,, | Performed by: ORTHOPAEDIC SURGERY

## 2023-01-23 PROCEDURE — 99213 OFFICE O/P EST LOW 20 MIN: CPT | Mod: PBBFAC

## 2023-01-23 PROCEDURE — 3008F PR BODY MASS INDEX (BMI) DOCUMENTED: ICD-10-PCS | Mod: CPTII,,, | Performed by: ORTHOPAEDIC SURGERY

## 2023-01-23 NOTE — PROGRESS NOTES
History reviewed. No pertinent past medical history.    Past Surgical History:   Procedure Laterality Date    HERNIA REPAIR      OPEN REDUCTION AND INTERNAL FIXATION (ORIF) OF FRACTURE OF TIBIAL PLATEAU Right 12/8/2022    Procedure: ORIF, FRACTURE, TIBIA, PLATEAU;  Surgeon: Yared Barr MD;  Location: [facility];  Service: Orthopedics;  Laterality: Right;    TONSILLECTOMY         No current outpatient medications on file.     No current facility-administered medications for this visit.       Review of patient's allergies indicates:  No Known Allergies    History reviewed. No pertinent family history.    Social History     Socioeconomic History    Marital status: Single   Tobacco Use    Smoking status: Every Day     Packs/day: 0.25     Years: 18.00     Pack years: 4.50     Types: Cigarettes    Smokeless tobacco: Never   Substance and Sexual Activity    Alcohol use: Yes     Alcohol/week: 4.0 standard drinks     Types: 4 Shots of liquor per week     Comment: once every couple weeks    Drug use: Never    Sexual activity: Not Currently       Chief Complaint:   Chief Complaint   Patient presents with    Right Lower Leg - Pain, Injury       History of present illness:  37-year-old male presents today status post ORIF of his right tibial plateau.  Patient is 6 weeks out from ORIF.  Overall doing well.  Maintaining nonweightbearing restrictions.  On crutches      Review of Systems:    Constitution: Negative for chills, fever, and sweats.  Negative for unexplained weight loss.    HENT:  Negative for headaches and blurry vision.    Cardiovascular:Negative for chest pain or irregular heart beat. Negative for hypertension.    Respiratory:  Negative for cough and shortness of breath.    Gastrointestinal: Negative for abdominal pain, heartburn, melena, nausea, and vomitting.    Genitourinary:  Negative bladder incontinence and dysuria.    Musculoskeletal:  See HPI    Neurological: Negative for  numbness.    Psychiatric/Behavioral: Negative for depression.  The patient is not nervous/anxious.      Endocrine: Negative for polyuria    Hematologic/Lymphatic: Negative for bleeding problem.  Does not bruise/bleed easily.    Skin: Negative for poor would healing and rash      Physical Examination:    Vital Signs:    Vitals:    01/23/23 1212   BP: 128/85   Pulse: 90   Resp: 18   Temp: 97.7 °F (36.5 °C)       Body mass index is 35.77 kg/m².    General: No acute distress, alert and oriented, healthy appearing    HEENT: Head is atraumatic, mucous membranes are moist    Neck: Supples, no JVD    Cardiovascular: Palpable dorsalis pedis and posterior tibial pulses, regular rate and rhythm to those pulses    Lungs: Breathing non-labored    Skin: no rashes appreciated    Neurologic: Can flex and extend knees, ankles, and toes. Sensation is grossly intact    Right knee:  Range of motion right knee from 0 to greater than 120  Incision clean and dry.    X-rays:  Three views of the right knee reviewed.  Patient's implants appear well fixed.  Fracture well aligned.  Interval consolidation of the fracture fragments alignment is maintained     Assessment::  Status post ORIF right tibial plateau    Plan:  Patient overall doing very well.  We will begin weight-bearing in a graduated fashion.  See him back in 6 weeks.  Expect full release to work at that point in time.  At this point he is light duty    This note was created using [employer] voice recognition software that occasionally misinterpreted phrases or words.    Consult note is delivered via Epic messaging service.

## 2023-01-23 NOTE — LETTER
January 23, 2023      [facility]  [address] [address]  Phone: [phone]       Patient: Luca Jensen   YOB: 1985  Date of Visit: 01/23/2023    To Whom It May Concern:    Nany Jensen  was at [facility] on 01/23/2023. The patient may return to work/school on 01/25/2023 with restrictions, no weight bearing to right leg until cleared by MD. If you have any questions or concerns, or if I can be of further assistance, please do not hesitate to contact me. [phone].    Sincerely,    Alexis Austin MD

## 2023-03-08 ENCOUNTER — OFFICE VISIT (OUTPATIENT)
Dept: ORTHOPEDICS | Facility: CLINIC | Age: 38
End: 2023-03-08
Payer: MEDICAID

## 2023-03-08 ENCOUNTER — HOSPITAL ENCOUNTER (OUTPATIENT)
Dept: RADIOLOGY | Facility: HOSPITAL | Age: 38
Discharge: HOME | End: 2023-03-08
Attending: STUDENT IN AN ORGANIZED HEALTH CARE EDUCATION/TRAINING PROGRAM
Payer: MEDICAID

## 2023-03-08 VITALS
WEIGHT: 220 LBS | HEIGHT: 66 IN | RESPIRATION RATE: 20 BRPM | DIASTOLIC BLOOD PRESSURE: 83 MMHG | HEART RATE: 77 BPM | SYSTOLIC BLOOD PRESSURE: 124 MMHG | BODY MASS INDEX: 35.36 KG/M2

## 2023-03-08 DIAGNOSIS — M25.561 ACUTE PAIN OF RIGHT KNEE: ICD-10-CM

## 2023-03-08 DIAGNOSIS — Z48.89 ENCOUNTER FOR POSTOPERATIVE WOUND CHECK: ICD-10-CM

## 2023-03-08 DIAGNOSIS — M25.561 ACUTE PAIN OF RIGHT KNEE: Primary | ICD-10-CM

## 2023-03-08 PROCEDURE — 3079F PR MOST RECENT DIASTOLIC BLOOD PRESSURE 80-89 MM HG: ICD-10-PCS | Mod: CPTII,,, | Performed by: STUDENT IN AN ORGANIZED HEALTH CARE EDUCATION/TRAINING PROGRAM

## 2023-03-08 PROCEDURE — 99213 OFFICE O/P EST LOW 20 MIN: CPT | Mod: PBBFAC

## 2023-03-08 PROCEDURE — 3008F PR BODY MASS INDEX (BMI) DOCUMENTED: ICD-10-PCS | Mod: CPTII,,, | Performed by: STUDENT IN AN ORGANIZED HEALTH CARE EDUCATION/TRAINING PROGRAM

## 2023-03-08 PROCEDURE — 99213 OFFICE O/P EST LOW 20 MIN: CPT | Mod: S$PBB,,, | Performed by: STUDENT IN AN ORGANIZED HEALTH CARE EDUCATION/TRAINING PROGRAM

## 2023-03-08 PROCEDURE — 1159F PR MEDICATION LIST DOCUMENTED IN MEDICAL RECORD: ICD-10-PCS | Mod: CPTII,,, | Performed by: STUDENT IN AN ORGANIZED HEALTH CARE EDUCATION/TRAINING PROGRAM

## 2023-03-08 PROCEDURE — 3074F PR MOST RECENT SYSTOLIC BLOOD PRESSURE < 130 MM HG: ICD-10-PCS | Mod: CPTII,,, | Performed by: STUDENT IN AN ORGANIZED HEALTH CARE EDUCATION/TRAINING PROGRAM

## 2023-03-08 PROCEDURE — 1159F MED LIST DOCD IN RCRD: CPT | Mod: CPTII,,, | Performed by: STUDENT IN AN ORGANIZED HEALTH CARE EDUCATION/TRAINING PROGRAM

## 2023-03-08 PROCEDURE — 3079F DIAST BP 80-89 MM HG: CPT | Mod: CPTII,,, | Performed by: STUDENT IN AN ORGANIZED HEALTH CARE EDUCATION/TRAINING PROGRAM

## 2023-03-08 PROCEDURE — 73564 X-RAY EXAM KNEE 4 OR MORE: CPT | Mod: TC,RT

## 2023-03-08 PROCEDURE — 3008F BODY MASS INDEX DOCD: CPT | Mod: CPTII,,, | Performed by: STUDENT IN AN ORGANIZED HEALTH CARE EDUCATION/TRAINING PROGRAM

## 2023-03-08 PROCEDURE — 99213 PR OFFICE/OUTPT VISIT, EST, LEVL III, 20-29 MIN: ICD-10-PCS | Mod: S$PBB,,, | Performed by: STUDENT IN AN ORGANIZED HEALTH CARE EDUCATION/TRAINING PROGRAM

## 2023-03-08 PROCEDURE — 3074F SYST BP LT 130 MM HG: CPT | Mod: CPTII,,, | Performed by: STUDENT IN AN ORGANIZED HEALTH CARE EDUCATION/TRAINING PROGRAM

## 2023-03-08 NOTE — PROGRESS NOTES
Orthopedic surgery clinic progress note    PMH:  History reviewed. No pertinent past medical history.    Past Surgical History:   Procedure Laterality Date    HERNIA REPAIR      OPEN REDUCTION AND INTERNAL FIXATION (ORIF) OF FRACTURE OF TIBIAL PLATEAU Right 12/8/2022    Procedure: ORIF, FRACTURE, TIBIA, PLATEAU;  Surgeon: Yared Barr MD;  Location: [facility];  Service: Orthopedics;  Laterality: Right;    TONSILLECTOMY         No current outpatient medications on file.     No current facility-administered medications for this visit.       Review of patient's allergies indicates:  No Known Allergies    History reviewed. No pertinent family history.    Social History     Socioeconomic History    Marital status: Single   Tobacco Use    Smoking status: Every Day     Packs/day: 0.25     Years: 18.00     Pack years: 4.50     Types: Cigarettes    Smokeless tobacco: Never   Substance and Sexual Activity    Alcohol use: Yes     Alcohol/week: 4.0 standard drinks     Types: 4 Shots of liquor per week     Comment: once every couple weeks    Drug use: Never    Sexual activity: Not Currently       Chief Complaint:   Chief Complaint   Patient presents with    Right Knee - Pain, Injury       History of present illness:  37-year-old male presents today status post ORIF of his right tibial plateau.  Patient is now 12 weeks out from his ORIF of his right tibial plateau, he is doing great and has progressed to weight-bearing as tolerated without assistive devices.  He has no remaining pain and with therapy he has regained full range of motion of the knee.      Review of Systems:    Negative except above      Physical Examination:    Vital Signs:    Vitals:    03/08/23 1132   BP: 124/83   Pulse: 77   Resp: 20       Body mass index is 35.51 kg/m².    General: No acute distress, alert and oriented, healthy appearing  HEENT: Head is atraumatic, mucous membranes are moist  Neck: Supple, no JVD  Cardiovascular: Palpable dorsalis pedis and  posterior tibial pulses, regular rate and rhythm to those pulses  Neurologic: Can flex and extend knees, ankles, and toes. Sensation is grossly intact    Right knee:  No tenderness to palpation over the knee  Skin intact, no evidence of infection  Range of motion right knee from 0 to greater than 120  Incision clean and dry, healed  Neurovascularly intact distally    X-rays:  Three views of the right knee reviewed.  Patient's implants appear well fixed.  Fracture well aligned.  Interval consolidation of the fracture fragments, alignment is maintained, no evidence of hardware loosening or failure     Assessment::    37-year-old male Status post ORIF right tibial plateau in December of 2022.    Plan:   Patient has healed his tibial plateau fracture, and is requesting a work release at this point which we provided him today.  He has no restrictions   He may return to clinic p.r.n.    Norbert Dubois MD  [facility]opedic [facility]

## 2023-03-08 NOTE — LETTER
March 8, 2023    Luca Jensen  [address] [place] [address]         [facility]  [address] [place] [id number]  Phone: [phone] March 8, 2023     Patient: Luca Jensen   YOB: 1985   Date of Visit: 3/8/2023       To Whom It May Concern:    It is my medical opinion that Luca Jensen may return to full duty immediately with no restrictions.    If you have any questions or concerns, please don't hesitate to call.    Sincerely,        Norbert Dubois MD

## 2023-03-13 NOTE — PROGRESS NOTES
Faculty Attestation: Luca Jensen  was seen at [facility] in the Orthopaedic Clinic. Patient seen and evaluated at the time of the visit. History of Present Illness, Physical Exam, and Assessment and Plan reviewed. Treatment plan is reasonable and appropriate. Compliance with treatment recommendations is important. No procedure was performed.     Dinesh Martin MD  Orthopaedic Surgery

## (undated) DEVICE — PAD ABDOMINAL STERILE 8X10IN

## (undated) DEVICE — GLOVE PROTEXIS BLUE LATEX 8

## (undated) DEVICE — DRAPE EXTREMITY STD 89X128IN

## (undated) DEVICE — KIT BASIC ORTHO UNIVERSITY

## (undated) DEVICE — GLOVE PROTEXIS LTX MICRO  7.5

## (undated) DEVICE — SUT CTD VICRYL 0 UND BR CT

## (undated) DEVICE — GOWN POLY REINF BRTH SLV XL

## (undated) DEVICE — COVER MAYO STAND REINFRCD 30

## (undated) DEVICE — STAPLER SKIN ROTATING HEAD

## (undated) DEVICE — BIT DRILL NON LOCK 2.5X216MM

## (undated) DEVICE — Device

## (undated) DEVICE — MANIFOLD 4 PORT

## (undated) DEVICE — BUCKET PLASTER DISPOSABLE

## (undated) DEVICE — CLIPPER BLADE MOD 4406 (CAREF)

## (undated) DEVICE — APPLICATOR CHLORAPREP ORN 26ML

## (undated) DEVICE — GAUZE SPONGE 4X4 12PLY

## (undated) DEVICE — K-WIRE TRCR PNT 2X150MM SS
Type: IMPLANTABLE DEVICE | Site: LEG | Status: NON-FUNCTIONAL
Removed: 2022-12-08

## (undated) DEVICE — COVER PROXIMA MAYO STAND

## (undated) DEVICE — GLOVE PROTEXIS NEOPRENE 7.5

## (undated) DEVICE — DRAPE C-ARM COVER EZ 36X28IN

## (undated) DEVICE — SUT 2/0 36IN COATED VICRYL

## (undated) DEVICE — DRESSING GAUZE XEROFORM 5X9

## (undated) DEVICE — SOL 9P NACL IRR PIC IL

## (undated) DEVICE — SPONGE LAP STRL 18X18IN

## (undated) DEVICE — GLOVE PROTEXIS BLUE LATEX 7.5

## (undated) DEVICE — TIP SUCTION YANKAUER

## (undated) DEVICE — HANDLE DEVON RIGID OR LIGHT

## (undated) DEVICE — GLOVE PROTEXIS HYDROGEL SZ7.5